# Patient Record
Sex: FEMALE | Race: OTHER | HISPANIC OR LATINO | Employment: UNEMPLOYED | URBAN - METROPOLITAN AREA
[De-identification: names, ages, dates, MRNs, and addresses within clinical notes are randomized per-mention and may not be internally consistent; named-entity substitution may affect disease eponyms.]

---

## 2023-07-07 ENCOUNTER — LAB VISIT (OUTPATIENT)
Dept: LAB | Facility: HOSPITAL | Age: 38
End: 2023-07-07
Attending: INTERNAL MEDICINE

## 2023-07-07 DIAGNOSIS — E11.9 DM II (DIABETES MELLITUS, TYPE II), CONTROLLED: ICD-10-CM

## 2023-07-07 DIAGNOSIS — E78.6 HDL DEFICIENCY: Primary | ICD-10-CM

## 2023-07-07 LAB
ALBUMIN SERPL-MCNC: 4 G/DL (ref 3.5–5)
ALBUMIN/GLOB SERPL: 1.3 RATIO (ref 1.1–2)
ALP SERPL-CCNC: 65 UNIT/L (ref 40–150)
ALT SERPL-CCNC: 25 UNIT/L (ref 0–55)
AST SERPL-CCNC: 22 UNIT/L (ref 5–34)
BASOPHILS # BLD AUTO: 0.04 X10(3)/MCL
BASOPHILS NFR BLD AUTO: 0.7 %
BILIRUBIN DIRECT+TOT PNL SERPL-MCNC: 0.5 MG/DL
BUN SERPL-MCNC: 14.6 MG/DL (ref 7–18.7)
CALCIUM SERPL-MCNC: 9.3 MG/DL (ref 8.4–10.2)
CHLORIDE SERPL-SCNC: 106 MMOL/L (ref 98–107)
CHOLEST SERPL-MCNC: 180 MG/DL
CHOLEST/HDLC SERPL: 3 {RATIO} (ref 0–5)
CO2 SERPL-SCNC: 26 MMOL/L (ref 22–29)
CREAT SERPL-MCNC: 0.76 MG/DL (ref 0.55–1.02)
EOSINOPHIL # BLD AUTO: 0.68 X10(3)/MCL (ref 0–0.9)
EOSINOPHIL NFR BLD AUTO: 11.8 %
ERYTHROCYTE [DISTWIDTH] IN BLOOD BY AUTOMATED COUNT: 13.2 % (ref 11.5–17)
GFR SERPLBLD CREATININE-BSD FMLA CKD-EPI: >60 MLS/MIN/1.73/M2
GLOBULIN SER-MCNC: 3 GM/DL (ref 2.4–3.5)
GLUCOSE SERPL-MCNC: 89 MG/DL (ref 74–100)
HCT VFR BLD AUTO: 45.1 % (ref 37–47)
HDLC SERPL-MCNC: 58 MG/DL (ref 35–60)
HGB BLD-MCNC: 14.5 G/DL (ref 12–16)
IMM GRANULOCYTES # BLD AUTO: 0.01 X10(3)/MCL (ref 0–0.04)
IMM GRANULOCYTES NFR BLD AUTO: 0.2 %
LDLC SERPL CALC-MCNC: 111 MG/DL (ref 50–140)
LYMPHOCYTES # BLD AUTO: 1.88 X10(3)/MCL (ref 0.6–4.6)
LYMPHOCYTES NFR BLD AUTO: 32.6 %
MCH RBC QN AUTO: 30.1 PG (ref 27–31)
MCHC RBC AUTO-ENTMCNC: 32.2 G/DL (ref 33–36)
MCV RBC AUTO: 93.8 FL (ref 80–94)
MONOCYTES # BLD AUTO: 0.44 X10(3)/MCL (ref 0.1–1.3)
MONOCYTES NFR BLD AUTO: 7.6 %
NEUTROPHILS # BLD AUTO: 2.71 X10(3)/MCL (ref 2.1–9.2)
NEUTROPHILS NFR BLD AUTO: 47.1 %
NRBC BLD AUTO-RTO: 0 %
PLATELET # BLD AUTO: 240 X10(3)/MCL (ref 130–400)
PMV BLD AUTO: 11.7 FL (ref 7.4–10.4)
POTASSIUM SERPL-SCNC: 4.5 MMOL/L (ref 3.5–5.1)
PROT SERPL-MCNC: 7 GM/DL (ref 6.4–8.3)
RBC # BLD AUTO: 4.81 X10(6)/MCL (ref 4.2–5.4)
SODIUM SERPL-SCNC: 141 MMOL/L (ref 136–145)
TRIGL SERPL-MCNC: 55 MG/DL (ref 37–140)
TSH SERPL-ACNC: 0.58 UIU/ML (ref 0.35–4.94)
VLDLC SERPL CALC-MCNC: 11 MG/DL
WBC # SPEC AUTO: 5.76 X10(3)/MCL (ref 4.5–11.5)

## 2023-07-07 PROCEDURE — 36415 COLL VENOUS BLD VENIPUNCTURE: CPT

## 2023-07-07 PROCEDURE — 80053 COMPREHEN METABOLIC PANEL: CPT

## 2023-07-07 PROCEDURE — 84443 ASSAY THYROID STIM HORMONE: CPT

## 2023-07-07 PROCEDURE — 80061 LIPID PANEL: CPT

## 2023-07-07 PROCEDURE — 85025 COMPLETE CBC W/AUTO DIFF WBC: CPT

## 2024-08-16 ENCOUNTER — OFFICE VISIT (OUTPATIENT)
Dept: URGENT CARE | Facility: CLINIC | Age: 39
End: 2024-08-16

## 2024-08-16 VITALS
BODY MASS INDEX: 23.82 KG/M2 | TEMPERATURE: 99 F | OXYGEN SATURATION: 97 % | SYSTOLIC BLOOD PRESSURE: 118 MMHG | HEART RATE: 76 BPM | RESPIRATION RATE: 16 BRPM | WEIGHT: 143 LBS | HEIGHT: 65 IN | DIASTOLIC BLOOD PRESSURE: 80 MMHG

## 2024-08-16 DIAGNOSIS — R05.2 SUBACUTE COUGH: Primary | ICD-10-CM

## 2024-08-16 RX ORDER — AZITHROMYCIN 250 MG/1
TABLET, FILM COATED ORAL
Qty: 6 TABLET | Refills: 0 | Status: SHIPPED | OUTPATIENT
Start: 2024-08-16

## 2024-08-16 NOTE — PATIENT INSTRUCTIONS
Discussed the physical finding, differential diagnosis.  Possible allergies, considering the duration of symptoms discussed in detail on mycoplasma  Antibiotics prescribed today.  Start Z-Samuel today  Antihistamine of choice over-the-counter like Claritin Zyrtec or Allegra for congestion  Robitussin DM for cough and cold.  Adequate hydration  For worsening symptoms like fever, chest pain or shortness a breath may need chest x-ray  Call or return to clinic for any questions.  Patient voiced understanding.  States did not have any questions for me via   Her fiance who speaks English voiced understanding as well

## 2024-08-16 NOTE — PROGRESS NOTES
"Subjective:      Patient ID: Ruthann Feliz is a 38 y.o. female.    Vitals:  height is 5' 4.96" (1.65 m) and weight is 64.9 kg (143 lb). Her temporal temperature is 98.6 °F (37 °C). Her blood pressure is 118/80 and her pulse is 76. Her respiration is 16 and oxygen saturation is 97%.     Chief Complaint: Cough     Patient is a 38 y.o. female who presents to urgent care with complaints of cough x 2-3 weeks. Alleviating factors include cough syrup (not sure of name) with no relief. Patient denies SOB, trouble breathing, fever.      Cough  This is a new problem. The current episode started 1 to 4 weeks ago. The cough is Non-productive. Associated symptoms include ear congestion. The treatment provided no relief.     [place]:   used.  Patient accompanied by her fiance who speaks English  Most encounter was  Ms. Viveros, nurse Nu in the room  Patient's presents with a history of coughing 2-3 weeks.  Dry coughing.  Bilateral ear congestion per nurse's note.  No fever.  Uncomfortable with increase coughing.  No chest pain.  No shortness a breath.  No history of asthma.  Does not smoke tobacco.  States has tried to over-the-counter medications not much help.  No concerns of exposure to infections.  Does house cleaning, also has 2 dogs.  Fiancee states discharge hair    ROS:  Constitutional : _ no fever, positive for feeling fatigued  HEENT : _No sore throat, positive for ear congestion  Neck : No pain, range of motion present  Respiratory : _Coughing, no shortness a breath, no wheezing  Cardiovascular : _No chest pain, no palpitations  Gastrointestinal : _No vomiting or diarrhea. No abdominal pain  Integumentary : _No skin rash      Objective:     Physical Exam  General : Alert and Oriented, No apparent distress, afebrile, appears comfortable sitting on the exam table, clear speech, talking in [place]ien initially with   Neck - supple  HENT : Oropharynx no redness or swelling.  " Bilateral TMs intact mild fluid no redness.   Respiratory : Bilateral equal breath sounds, nonlabored respirations  Cardiovascular : Rate, rhythm regular, normal volume pulse, no murmur  Gastrointestinal: Full abdomen, soft, nontender to palpate  Integumentary : Warm, Dry and no rash    Assessment:     1. Subacute cough      Plan:   Discussed the physical finding, differential diagnosis.  Possible allergies, considering the duration of symptoms discussed in detail on mycoplasma  Antibiotics prescribed today.  Start Z-Semaj today  Antihistamine of choice over-the-counter like Claritin Zyrtec or Allegra for congestion  Robitussin DM for cough and cold.  Adequate hydration  For worsening symptoms like fever, chest pain or shortness a breath may need chest x-ray  Call or return to clinic for any questions.  Patient voiced understanding.  States did not have any questions for me via   Her fiance who speaks English voiced understanding as well    Subacute cough  -     azithromycin (Z-SEMAJ) 250 MG tablet; Take 2 tablets by mouth on day 1; Take 1 tablet by mouth on days 2-5  Dispense: 6 tablet; Refill: 0